# Patient Record
Sex: FEMALE
[De-identification: names, ages, dates, MRNs, and addresses within clinical notes are randomized per-mention and may not be internally consistent; named-entity substitution may affect disease eponyms.]

---

## 2024-04-23 PROBLEM — Z00.00 ENCOUNTER FOR PREVENTIVE HEALTH EXAMINATION: Status: ACTIVE | Noted: 2024-04-23

## 2024-05-17 ENCOUNTER — APPOINTMENT (OUTPATIENT)
Dept: PULMONOLOGY | Facility: CLINIC | Age: 64
End: 2024-05-17
Payer: COMMERCIAL

## 2024-05-17 VITALS
DIASTOLIC BLOOD PRESSURE: 84 MMHG | HEIGHT: 69 IN | TEMPERATURE: 97.4 F | SYSTOLIC BLOOD PRESSURE: 146 MMHG | WEIGHT: 248 LBS | BODY MASS INDEX: 36.73 KG/M2 | HEART RATE: 49 BPM | OXYGEN SATURATION: 99 %

## 2024-05-17 DIAGNOSIS — R05.9 COUGH, UNSPECIFIED: ICD-10-CM

## 2024-05-17 DIAGNOSIS — G47.33 OBSTRUCTIVE SLEEP APNEA (ADULT) (PEDIATRIC): ICD-10-CM

## 2024-05-17 DIAGNOSIS — R74.8 ABNORMAL LEVELS OF OTHER SERUM ENZYMES: ICD-10-CM

## 2024-05-17 LAB — HEMOGLOBIN: 14.4

## 2024-05-17 PROCEDURE — 94727 GAS DIL/WSHOT DETER LNG VOL: CPT

## 2024-05-17 PROCEDURE — 94010 BREATHING CAPACITY TEST: CPT

## 2024-05-17 PROCEDURE — 85018 HEMOGLOBIN: CPT | Mod: QW

## 2024-05-17 PROCEDURE — 99204 OFFICE O/P NEW MOD 45 MIN: CPT | Mod: 25

## 2024-05-17 PROCEDURE — 94729 DIFFUSING CAPACITY: CPT

## 2024-05-17 PROCEDURE — ZZZZZ: CPT

## 2024-05-17 NOTE — ASSESSMENT
[FreeTextEntry1] : 63-year-old woman who presented with bradycardia and an abnormal ACE level.  She had an extensive workup for sarcoidosis that appears negative this includes an MRI of the heart a CT of the chest and PFTs.  ACE testing is often nonspecific and I would not pursue this diagnosis further.  She has a history of recently diagnosed obstructive sleep apnea and is currently on auto titrating CPAP.  Her current pressure setting is 4-20 and I have reduced the maximal pressure to 12 as the data download demonstrates that she does not require a higher pressure.  She should follow-up with the prescribing physician for a formal compliance visit so there are no issues with reimbursement regarding the equipment.  Once this has completed I will be happy to assume the care for her sleep apnea should she desire.

## 2024-05-17 NOTE — PROCEDURE
[FreeTextEntry1] : Chest X-ray report dated 4/9/2024 [facility] reviewed and normal Chest CT dated 4/13/2024 [facility], reviewed and normal Echo Cardiogram dated 04/24/24 demonstrated no specific findings.  Cardiac MRI reported Negative.  Normal PFT  Home sleep study demonstrates severe MEGGAN with AHI of 33 events per hour

## 2024-05-17 NOTE — HISTORY OF PRESENT ILLNESS
[TextBox_4] : ENRIQUE BOJORQUEZ is a 63 year old female, referred for evaluation of possible sarcoidosis.  Patient had presented with bradycardia.  She was noted to have an abnormal ACE level.  She was referred for an extensive cardiac workup which was negative.  She also had a CT scan of the chest.  She has no pulmonary complaints at this time.  She was diagnosed with sleep apnea she has a history of snoring daytime sleepiness nonrestorative sleep she received CPAP recently and she has been using it for about the last 2 weeks.  She does feel it is helping her significantly.  Denies chronic cough wheezing or congestion.

## 2024-05-17 NOTE — ADDENDUM
[FreeTextEntry1] : I, Stacy Wright, acted solely as a scribe for Dr. Shakir Rob M.D. on this date 05/17/2024.   All medical record entries made by the Scribe were at my, Dr. Shakir Rob M.D., direction and personally dictated by me on 05/17/2024. I have reviewed the chart and agree that the record accurately reflects my personal performance of the history, physical exam, assessment and plan. I have also personally directed, reviewed, and agreed with the chart.